# Patient Record
Sex: MALE | Race: WHITE | NOT HISPANIC OR LATINO | URBAN - METROPOLITAN AREA
[De-identification: names, ages, dates, MRNs, and addresses within clinical notes are randomized per-mention and may not be internally consistent; named-entity substitution may affect disease eponyms.]

---

## 2024-10-18 ENCOUNTER — EMERGENCY (EMERGENCY)
Facility: HOSPITAL | Age: 34
LOS: 1 days | Discharge: HOME | End: 2024-10-18
Attending: EMERGENCY MEDICINE | Admitting: EMERGENCY MEDICINE
Payer: COMMERCIAL

## 2024-10-18 VITALS
HEART RATE: 82 BPM | DIASTOLIC BLOOD PRESSURE: 73 MMHG | TEMPERATURE: 97 F | RESPIRATION RATE: 16 BRPM | OXYGEN SATURATION: 98 % | SYSTOLIC BLOOD PRESSURE: 114 MMHG

## 2024-10-18 PROCEDURE — 99284 EMERGENCY DEPT VISIT MOD MDM: CPT
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
## 2024-10-19 VITALS
TEMPERATURE: 97 F | OXYGEN SATURATION: 98 % | DIASTOLIC BLOOD PRESSURE: 70 MMHG | RESPIRATION RATE: 16 BRPM | HEART RATE: 75 BPM | SYSTOLIC BLOOD PRESSURE: 116 MMHG

## 2024-10-19 PROCEDURE — 73030 X-RAY EXAM OF SHOULDER: CPT | Mod: 26,LT

## 2024-10-19 PROCEDURE — 70450 CT HEAD/BRAIN W/O DYE: CPT | Mod: MC

## 2024-10-19 PROCEDURE — 70450 CT HEAD/BRAIN W/O DYE: CPT | Mod: 26,MC

## 2024-10-19 PROCEDURE — 99284 EMERGENCY DEPT VISIT MOD MDM: CPT | Mod: 25

## 2024-10-19 PROCEDURE — 73030 X-RAY EXAM OF SHOULDER: CPT

## 2024-10-19 RX ORDER — ACETAMINOPHEN 325 MG
650 TABLET ORAL ONCE
Refills: 0 | Status: COMPLETED | OUTPATIENT
Start: 2024-10-19 | End: 2024-10-19

## 2024-10-19 RX ADMIN — Medication 650 MILLIGRAM(S): at 00:41

## 2024-10-19 RX ADMIN — Medication 600 MILLIGRAM(S): at 06:06

## 2024-10-19 NOTE — ED PROVIDER NOTE - NS ED ROS FT
CONSTITUTIONAL:  No weakness, fevers or chills  EYES/ENT:  No visual changes;  No vertigo or throat pain   NECK:  No pain or stiffness  RESPIRATORY:  No cough, wheezing, hemoptysis; No shortness of breath  CARDIOVASCULAR:  No chest pain or palpitations  GASTROINTESTINAL:  No abdominal or epigastric pain. No nausea, vomiting, or hematemesis; No diarrhea or constipation. No melena or hematochezia.  GENITOURINARY:  No dysuria, frequency or hematuria  MUSCULOSKELETAL: as per HPI, FROM all extremities, normal strength  NEUROLOGICAL:  No numbness or weakness  SKIN:  No itching, rashes

## 2024-10-19 NOTE — ED PROVIDER NOTE - PHYSICAL EXAMINATION
T(C): 36.3 (10-18-24 @ 23:53), Max: 36.3 (10-18-24 @ 23:53)  HR: 82 (10-18-24 @ 23:53) (82 - 82)  BP: 114/73 (10-18-24 @ 23:53) (114/73 - 114/73)  RR: 16 (10-18-24 @ 23:53) (16 - 16)  SpO2: 98% (10-18-24 @ 23:53) (98% - 98%)    CONSTITUTIONAL: no apparent distress, sleeping in bed  EYES: PERRLA and symmetric, EOMI, no conjunctival or scleral injection, non-icteric  ENMT: Oral mucosa with moist membranes  HEAD: No visible or palpable signs of injury, including lacerations, abrasions, or swelling. No tenderness to palpation.  NECK: Supple, symmetric and without tracheal deviation   RESP: No respiratory distress, no use of accessory muscles; CTA b/l  CV: RRR, +S1S2, no MRG; no JVD; no peripheral edema  GI: Soft, NT, ND, no rebound, no guarding; no palpable masses  LYMPH: No cervical LAD or tenderness  MSK: Normal gait, normal ROM of all joints without pain, normal muscle strength/tone  SKIN: No rashes or ulcers noted  NEURO: CN II-XII intact; sensation intact in upper and lower extremities  PSYCH: Appropriate insight/judgment; A+O x 3, mood and affect appropriate, recent/remote memory intact T(C): 36.3 (10-18-24 @ 23:53), Max: 36.3 (10-18-24 @ 23:53)  HR: 82 (10-18-24 @ 23:53) (82 - 82)  BP: 114/73 (10-18-24 @ 23:53) (114/73 - 114/73)  RR: 16 (10-18-24 @ 23:53) (16 - 16)  SpO2: 98% (10-18-24 @ 23:53) (98% - 98%)    CONSTITUTIONAL: no apparent distress, sleeping in bed  EYES: PERRLA and symmetric, EOMI, no conjunctival or scleral injection, non-icteric  ENMT: Oral mucosa with moist membranes  HEAD: No visible or palpable signs of injury, including lacerations, abrasions, or swelling. No tenderness to palpation.  NECK: Supple, symmetric and without tracheal deviation   RESP: No respiratory distress, no use of accessory muscles; CTA b/l  CV: RRR, +S1S2, no MRG; no JVD; no peripheral edema  GI: Soft, NT, ND, no rebound, no guarding; no palpable masses  LYMPH: No cervical LAD or tenderness  MSK: normal ROM of all joints without pain, normal muscle strength/tone  SKIN: No rashes or ulcers noted  NEURO: CN II-XII intact; sensation intact in upper and lower extremities  PSYCH: Appropriate insight/judgment; A+O x 3, mood and affect appropriate, recent/remote memory intact 0
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
## 2024-10-19 NOTE — ED PROVIDER NOTE - PATIENT PORTAL LINK FT
You can access the FollowMyHealth Patient Portal offered by [facility] by registering at the following website: [email or web address]. By joining [employer]’s FollowMyHealth portal, you will also be able to view your health information using other applications (apps) compatible with our system.
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
## 2024-10-21 DIAGNOSIS — M79.662 PAIN IN LEFT LOWER LEG: ICD-10-CM

## 2024-10-21 DIAGNOSIS — M25.512 PAIN IN LEFT SHOULDER: ICD-10-CM
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
## 2024-10-21 DIAGNOSIS — S09.90XA UNSPECIFIED INJURY OF HEAD, INITIAL ENCOUNTER: ICD-10-CM
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
## 2024-10-21 DIAGNOSIS — Z59.00 HOMELESSNESS UNSPECIFIED: ICD-10-CM

## 2024-10-21 SDOH — ECONOMIC STABILITY - HOUSING INSECURITY: HOMELESSNESS UNSPECIFIED: Z59.00

## 2024-11-05 ENCOUNTER — EMERGENCY (EMERGENCY)
Facility: HOSPITAL | Age: 34
LOS: 1 days | Discharge: HOME | End: 2024-11-05
Admitting: EMERGENCY MEDICINE
Payer: MEDICAID

## 2024-11-05 DIAGNOSIS — M79.672 PAIN IN LEFT FOOT: ICD-10-CM

## 2024-11-05 DIAGNOSIS — Z59.00 HOMELESSNESS UNSPECIFIED: ICD-10-CM

## 2024-11-05 DIAGNOSIS — M79.671 PAIN IN RIGHT FOOT: ICD-10-CM

## 2024-11-05 PROCEDURE — 99283 EMERGENCY DEPT VISIT LOW MDM: CPT

## 2024-11-05 SDOH — ECONOMIC STABILITY - HOUSING INSECURITY: HOMELESSNESS UNSPECIFIED: Z59.00

## 2024-11-05 NOTE — ED ADULT TRIAGE NOTE - CHIEF COMPLAINT QUOTE
pt. presents c/o bilateral foot pain, Pt. agitated in triage refusing vital signs and demanding a bed to lay in.

## 2024-11-05 NOTE — ED PROVIDER NOTE - CLINICAL SUMMARY MEDICAL DECISION MAKING FREE TEXT BOX
well appearing pt here with b/l foot pain aching mod in severity with full ROM, no signs of infection or induration, plan: socks

## 2024-11-05 NOTE — ED PROVIDER NOTE - PATIENT PORTAL LINK FT
You can access the FollowMyHealth Patient Portal offered by [facility] by registering at the following website: [email or web address]. By joining [employer]’s FollowMyHealth portal, you will also be able to view your health information using other applications (apps) compatible with our system.

## 2024-11-05 NOTE — ED PROVIDER NOTE - PHYSICAL EXAMINATION
Physical Exam    Vital Signs: I have reviewed the initial vital signs.  Constitutional: well-appearing, appears stated age  Cardiovascular: +S1/S2, no murmurs, regular rate, regular rhythm, well-perfused extremities  Respiratory: unlabored respiratory effort, clear to auscultation bilaterally, speaks in full sentences  Gastrointestinal: soft, non-tender abdomen, no pulsatile mass  Msk: feet appears clean intact with no ulcers and no ttp, no erythema, no induration  Neurologic: LE and UE extremities’ motor and sensory functions grossly intact

## 2024-11-05 NOTE — ED ADULT NURSE NOTE - OBJECTIVE STATEMENT
Pt BIBA complaining of bilateral foot pain. Pt unwilling to cooperate with questions or vitals, Able to ambulate without difficulty
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
## 2024-12-20 NOTE — ED ADULT TRIAGE NOTE - NS ED NURSE AMBULANCES
12/20/24    Dorian Marshall PA-C  [address]  [facility]  [facility] [address]      Dear Dr. Dorian Marshall PA-C,    I am writing to confirm that your patient, Andres Waters  received care in my office on 12/20/24. I have enclosed a summary of the care provided to Andres for your reference.    Please contact me with any questions you may have regarding the visit.    Sincerely,         An Lopez MD  [address]   [facility] [address]  [facility] [facility] [address]  [phone]    CC: No Recipients [facility]